# Patient Record
(demographics unavailable — no encounter records)

---

## 2024-10-24 NOTE — HISTORY OF PRESENT ILLNESS
[FreeTextEntry1] : 34-year-old female seen in the ED with severe left flank pain.  She was found on CT scan to have a 3 mm left UVJ stone.  This is her first stone event.  She states her pain has resolved.

## 2024-10-24 NOTE — ASSESSMENT
[FreeTextEntry1] : 34-year-old female found to have a ureteral stone causing obstruction.  Renal ultrasound done today did not demonstrate any hydronephrosis.  She did have microscopic hematuria on her urinalysis.  Will repeat today to ensure this is cleared.  Plan I have reviewed images the patient CT abdomen pelvis and discussed results with the patient demonstrating a 3 mm left UVJ stone  I have reviewed images of patient's renal us and discussed results with patient demonstrating no hydronephrosis or stones Reviewed labs from ED visit including urinalysis with 12 RBC/hpf and urine culture negative Repeat urinalysis to ensure microscopic hematuria has resolved with stone passage Dietary modifications for stone prevention discussed   Patient is being seen today for evaluation and management of a chronic and longitudinal ongoing condition and I am of the primary treating physician.